# Patient Record
Sex: MALE | Race: WHITE | NOT HISPANIC OR LATINO | Employment: OTHER | URBAN - METROPOLITAN AREA
[De-identification: names, ages, dates, MRNs, and addresses within clinical notes are randomized per-mention and may not be internally consistent; named-entity substitution may affect disease eponyms.]

---

## 2018-10-05 ENCOUNTER — OFFICE VISIT CONVERTED (OUTPATIENT)
Dept: UROLOGY | Facility: CLINIC | Age: 63
End: 2018-10-05
Attending: UROLOGY

## 2019-10-08 ENCOUNTER — HOSPITAL ENCOUNTER (OUTPATIENT)
Dept: OTHER | Facility: HOSPITAL | Age: 64
Discharge: HOME | End: 2019-10-08
Attending: UROLOGY

## 2019-10-08 LAB — PSA SERPL-MCNC: 0.88 NG/ML (ref 0–4)

## 2019-10-09 ENCOUNTER — CONVERSION ENCOUNTER (OUTPATIENT)
Dept: SURGERY | Facility: CLINIC | Age: 64
End: 2019-10-09

## 2019-10-09 ENCOUNTER — OFFICE VISIT CONVERTED (OUTPATIENT)
Dept: UROLOGY | Facility: CLINIC | Age: 64
End: 2019-10-09
Attending: UROLOGY

## 2019-11-05 ENCOUNTER — OFFICE VISIT CONVERTED (OUTPATIENT)
Dept: SURGERY | Facility: CLINIC | Age: 64
End: 2019-11-05
Attending: NURSE PRACTITIONER

## 2020-01-09 ENCOUNTER — OFFICE VISIT CONVERTED (OUTPATIENT)
Dept: SURGERY | Facility: CLINIC | Age: 65
End: 2020-01-09
Attending: SURGERY

## 2020-01-24 ENCOUNTER — HOSPITAL ENCOUNTER (OUTPATIENT)
Dept: PERIOP | Facility: HOSPITAL | Age: 65
Setting detail: HOSPITAL OUTPATIENT SURGERY
Discharge: HOME | End: 2020-01-24
Attending: SURGERY

## 2020-02-03 ENCOUNTER — OFFICE VISIT CONVERTED (OUTPATIENT)
Dept: SURGERY | Facility: CLINIC | Age: 65
End: 2020-02-03
Attending: SURGERY

## 2020-02-03 ENCOUNTER — CONVERSION ENCOUNTER (OUTPATIENT)
Dept: SURGERY | Facility: CLINIC | Age: 65
End: 2020-02-03

## 2020-10-07 ENCOUNTER — HOSPITAL ENCOUNTER (OUTPATIENT)
Dept: FAMILY MEDICINE CLINIC | Facility: CLINIC | Age: 65
Discharge: HOME | End: 2020-10-07
Attending: UROLOGY

## 2020-10-07 LAB — PSA SERPL-MCNC: 0.99 NG/ML (ref 0–4)

## 2020-10-09 ENCOUNTER — TELEPHONE CONVERTED (OUTPATIENT)
Dept: UROLOGY | Facility: CLINIC | Age: 65
End: 2020-10-09
Attending: UROLOGY

## 2021-05-13 NOTE — PROGRESS NOTES
Progress Note      Patient Name: Parveen Silva   Patient ID: [id number]   Sex: Male   YOB: 1955    Primary Care Provider: No PCP No PCP Other   Referring Provider: IGOR DEE    Visit Date: October 9, 2020    Provider: Terry Mueller MD   [facility]: [facility]   Location Address: [address] [place] [address]  [id number]   Location Phone: [phone]          Chief Complaint  · pt here for urologic issues      History Of Present Illness  TELEHEALTH TELEPHONE VISIT  Parveen Silva is a 65 year old /White male who is presenting for evaluation via telehealth telephone visit. Verbal consent obtained before beginning visit.   Provider spent 13 minutes with the patient during the telehealth visit.   The following staff were present during this visit: Renee Dillard   Past Medical History/ Overview of Patient Symptoms     65-year-old  gentleman who follows up today for nephrolithiasis, BPH and prostate cancer screening.    on Flomax  0.4 mg.  voiding ok. slow at times.  A little worse.  maybe. Not bothered. Nocturia X1.    Finasteride  -  caused  joint issues alex in the shoulder.    No gross hematuria     No acute stone episodes since last visit.      Previous     Patient has had one 10 stones, 2 lithotripsies    He was on potassium citrate in the past, not taking this currently    No cardiopulmonary history.  Smokes 1 pack per day.  Patient does not use blood thinner.   2006 right and left inguinal hernia repairs    PVR    10/19   149  10/18   120    PSA    10/20  0.99  10/19  0.88  10/18  0.59  9/17   1.12  9/16   1.00  9/15   1.11  8/11   1.40  9/09   1.0             Past Medical History  Hernia; Kidney stones         Past Surgical History  Cholecstectomy; Colonoscopy; Cyst Removal; Hernia; Kidney Stone Surgery, Unspecified; Other; Splenectomy         Medication List  Flomax 0.4 mg oral capsule; omeprazole 20 mg oral capsule,delayed release(DR/EC);  Prilosec oral         Allergy List  NO KNOWN DRUG ALLERGIES         Family Medical History  Cancer, Unspecified         Social History  Tobacco (Current every day)         Review of Systems  · Constitutional  o Denies  o : chills  · Respiratory  o Denies  o : cough  · Gastrointestinal  o Denies  o : nausea              Assessment  · Benign prostatic hyperplasia with weak urinary stream       Benign prostatic hyperplasia with lower urinary tract symptoms     600.01/N40.1  Poor urinary stream     600.01/R39.12  · Nephrolithiasis     592.0/N20.0  · Prostate cancer screening     V76.44/Z12.5      Plan  · Orders  o Physician Telephone Evaluation, 11-20 minutes (55939) - 600.01/N40.1, 600.01/R39.12, 592.0/N20.0 - 10/09/2020  o PSA Screening, Ultrasensitive, MEDICARE [facility] () - V76.44/Z12.5 - 10/09/2021  · Medications  o Medications have been Reconciled  o Transition of Care or Provider Policy  · Instructions  o Plan Of Care:   o Electronically Identified Patient Education Materials Provided Electronically       BPH    Continue Flomax 0.4 mg QD    cannot tolerate finasteride    not interested in procedures at this time.       Follow-up 1 year with PVR and PSA                     Electronically Signed by: Terry Mueller MD -Author on October 9, 2020 03:55:07 PM

## 2021-05-15 VITALS — WEIGHT: 184 LBS | HEIGHT: 71 IN | RESPIRATION RATE: 16 BRPM | BODY MASS INDEX: 25.76 KG/M2

## 2021-05-15 VITALS — WEIGHT: 177.37 LBS | HEIGHT: 71 IN | RESPIRATION RATE: 18 BRPM | BODY MASS INDEX: 24.83 KG/M2

## 2021-05-15 VITALS — RESPIRATION RATE: 18 BRPM | BODY MASS INDEX: 25.76 KG/M2 | WEIGHT: 184 LBS | HEIGHT: 71 IN

## 2021-05-15 VITALS — WEIGHT: 180 LBS | HEIGHT: 71 IN | HEART RATE: 65 BPM | BODY MASS INDEX: 25.2 KG/M2 | OXYGEN SATURATION: 98 %

## 2021-05-16 VITALS — RESPIRATION RATE: 16 BRPM | WEIGHT: 165 LBS | HEIGHT: 71 IN | BODY MASS INDEX: 23.1 KG/M2

## 2021-10-26 ENCOUNTER — LAB (OUTPATIENT)
Dept: LAB | Facility: HOSPITAL | Age: 66
End: 2021-10-26

## 2021-10-26 ENCOUNTER — TRANSCRIBE ORDERS (OUTPATIENT)
Dept: UROLOGY | Facility: CLINIC | Age: 66
End: 2021-10-26

## 2021-10-26 DIAGNOSIS — Z12.5 SPECIAL SCREENING FOR MALIGNANT NEOPLASM OF PROSTATE: ICD-10-CM

## 2021-10-26 DIAGNOSIS — Z12.5 SPECIAL SCREENING FOR MALIGNANT NEOPLASM OF PROSTATE: Primary | ICD-10-CM

## 2021-10-26 LAB — PSA SERPL-MCNC: 0.89 NG/ML (ref 0–4)

## 2021-10-26 PROCEDURE — 36415 COLL VENOUS BLD VENIPUNCTURE: CPT

## 2021-10-26 PROCEDURE — G0103 PSA SCREENING: HCPCS

## 2021-11-06 PROBLEM — N40.1 BENIGN PROSTATIC HYPERPLASIA WITH URINARY FREQUENCY: Status: ACTIVE | Noted: 2021-11-06

## 2021-11-06 PROBLEM — R35.0 BENIGN PROSTATIC HYPERPLASIA WITH URINARY FREQUENCY: Status: ACTIVE | Noted: 2021-11-06

## 2021-11-06 NOTE — PROGRESS NOTES
Chief Complaint    Urologic complaint    Subjective          Parveen Silva presents to [facility] UROLOGY  History of Present Illness     66-year-old  gentleman who follows up today for nephrolithiasis, BPH and prostate cancer screening.    on Flomax  0.4 mg.  About the same. voiding ok. slow at times.  A little worse.  maybe. Not bothered. Nocturia X1-3    No GH    No acute stone episodes since last visit.    Not worried about ED    No cardiopulmonary history.  Smokes 1 pack per day.  Patient does not use blood thinner.   2006 right and left inguinal hernia repairs    Previous     Finasteride  -  caused  joint issues alex in the shoulder.    Patient has had one 10 stones, 2 lithotripsies    He was on potassium citrate in the past, not taking this currently      PVR    10/21   175  10/19   149  10/18   120    PSA    10/21  0.88  10/20  0.99  10/19  0.88  10/18  0.59  9/17   1.12  9/16   1.00  9/15   1.11  8/11   1.40  9/09   1.0         Results for orders placed or performed in visit on 11/08/21   Bladder Scan   Result Value Ref Range    Urine Volume 175          Past History:  Medical History: has no past medical history on file.   Surgical History: has no past surgical history on file.   Family History: family history is not on file.   Social History:   Allergies: Patient has no allergy information on record.     No current outpatient medications on file.     Physical exam       Alert and orient x3  Well appearing, well developed, in no acute distress   Unlabored respirations    Grossly oriented to person, place and time, judgment is intact, normal mood and affect    Results for orders placed or performed in visit on 10/26/21   PSA Screen    Specimen: Blood   Result Value Ref Range    PSA 0.889 0.000 - 4.000 ng/mL        Objective     Vital Signs:   There were no vitals taken for this visit.             Assessment and Plan    Diagnoses and all orders for this visit:    1. Benign  prostatic hyperplasia with urinary frequency (Primary)        Continue Flomax 0.4 mg QD.  Refilled today      Has been mildly interested in Rezum in the past but not excited about doing this in the office.      We have also discussed risk and benefits of TURP but at this time he is not interested in procedures       Follow-up 1 year with PVR and PSA

## 2021-11-08 ENCOUNTER — OFFICE VISIT (OUTPATIENT)
Dept: UROLOGY | Facility: CLINIC | Age: 66
End: 2021-11-08

## 2021-11-08 VITALS — RESPIRATION RATE: 17 BRPM | WEIGHT: 184 LBS | BODY MASS INDEX: 24.92 KG/M2 | HEIGHT: 72 IN

## 2021-11-08 DIAGNOSIS — Z12.5 ENCOUNTER FOR SCREENING FOR MALIGNANT NEOPLASM OF PROSTATE: ICD-10-CM

## 2021-11-08 DIAGNOSIS — N40.1 BENIGN PROSTATIC HYPERPLASIA WITH URINARY FREQUENCY: Primary | ICD-10-CM

## 2021-11-08 DIAGNOSIS — R35.0 BENIGN PROSTATIC HYPERPLASIA WITH URINARY FREQUENCY: Primary | ICD-10-CM

## 2021-11-08 LAB — URINE VOLUME: 175

## 2021-11-08 PROCEDURE — 51798 US URINE CAPACITY MEASURE: CPT | Performed by: UROLOGY

## 2021-11-08 PROCEDURE — 99213 OFFICE O/P EST LOW 20 MIN: CPT | Performed by: UROLOGY

## 2021-11-08 RX ORDER — TAMSULOSIN HYDROCHLORIDE 0.4 MG/1
1 CAPSULE ORAL DAILY
COMMUNITY
Start: 2021-08-20 | End: 2021-11-08 | Stop reason: SDUPTHER

## 2021-11-08 RX ORDER — TAMSULOSIN HYDROCHLORIDE 0.4 MG/1
1 CAPSULE ORAL DAILY
Qty: 90 CAPSULE | Refills: 4 | Status: SHIPPED | OUTPATIENT
Start: 2021-11-08 | End: 2022-02-06

## 2022-12-19 ENCOUNTER — LAB (OUTPATIENT)
Dept: LAB | Facility: HOSPITAL | Age: 67
End: 2022-12-19

## 2022-12-19 DIAGNOSIS — Z12.5 PROSTATE CANCER SCREENING: Primary | ICD-10-CM

## 2022-12-19 DIAGNOSIS — Z12.5 PROSTATE CANCER SCREENING: ICD-10-CM

## 2022-12-19 LAB — PSA SERPL-MCNC: 1.5 NG/ML (ref 0–4)

## 2022-12-19 PROCEDURE — G0103 PSA SCREENING: HCPCS

## 2022-12-19 PROCEDURE — 36415 COLL VENOUS BLD VENIPUNCTURE: CPT

## 2022-12-28 PROBLEM — N40.1 BENIGN PROSTATIC HYPERPLASIA WITH LOWER URINARY TRACT SYMPTOMS: Status: ACTIVE | Noted: 2022-12-28

## 2022-12-28 NOTE — PROGRESS NOTES
Chief Complaint    Urologic complaint    Subjective          Parveen Silva presents to [facility] UROLOG[facility]  History of Present Illness     67-year-old  gentleman       Nephrolithiasis   BPH    Prostate cancer screening.        on Flomax  0.4 mg.  Doing okay, not worse.  Not bothered  Nocturia X 0 - 2    Decrease caffeine intake.    No GH    No acute stone episodes in the last year.      No cardiopulmonary history.  Smokes 1 pack per day.  Patient does not use blood thinner.      Previous        2006 right and left inguinal hernia repairs    Not worried about ED      Finasteride  -  caused  joint issues alex in the shoulder.    Patient has had one 10 stones, 2 lithotripsies    He was on potassium citrate in the past, not taking this currently      PVR    12/22   160  10/21   175  10/19   149  10/18   120    PSA    12/22      1.5  10/21       0.88  10/20      0.99  10/19      0.88  10/18    0.59  9/17      1.12  9/16      1.00  9/15      1.11  8/11    1.40  9/09     1.0      Results for orders placed or performed in visit on 12/30/22   Bladder Scan   Result Value Ref Range    Urine Volume 160           Results for orders placed or performed in visit on 12/19/22   PSA Screen    Specimen: Blood   Result Value Ref Range    PSA 1.500 0.000 - 4.000 ng/mL         Past History:  Medical History: has no past medical history on file.   Surgical History: has no past surgical history on file.   Family History: family history is not on file.   Social History: reports that he has been smoking cigarettes. He has been smoking an average of 1 pack per day. He has never used smokeless tobacco.  Allergies: Patient has no known allergies.     No current outpatient medications on file.         Results for orders placed or performed in visit on 12/19/22   PSA Screen    Specimen: Blood   Result Value Ref Range    PSA 1.500 0.000 - 4.000 ng/mL        Bladder Scan interpretation 12/30/2022    Estimation of  residual urine via [employer] 3000 Verathon Bladder Scan  MA/nurse performing: Peg Calzada RN  Residual Urine: 160 ml  Indication: Benign prostatic hyperplasia with lower urinary tract symptoms, symptom details unspecified    Prostate cancer screening   Position: Supine  Examination: Incremental scanning of the suprapubic area using 2.0 MHz transducer using copious amounts of acoustic gel.   Findings: An anechoic area was demonstrated which represented the bladder, with measurement of residual urine as noted. I inspected this myself. In that the residual urine was stable or insignificant, refer to plan for treatment and plan necessary at this time.         Objective     Vital Signs:   There were no vitals taken for this visit.             Assessment and Plan    Diagnoses and all orders for this visit:    1. Benign prostatic hyperplasia with lower urinary tract symptoms, symptom details unspecified (Primary)        Continue Flomax 0.4 mg QD.  Refilled today      Patient doing better currently.  Not wanting procedures.  Might be interested in TURP in the future.      Follow-up 1 year  PSA    PVR at f/u

## 2022-12-30 ENCOUNTER — OFFICE VISIT (OUTPATIENT)
Dept: UROLOGY | Facility: CLINIC | Age: 67
End: 2022-12-30

## 2022-12-30 VITALS — WEIGHT: 184 LBS | BODY MASS INDEX: 24.92 KG/M2 | HEIGHT: 72 IN

## 2022-12-30 DIAGNOSIS — Z12.5 PROSTATE CANCER SCREENING: ICD-10-CM

## 2022-12-30 DIAGNOSIS — N40.1 BENIGN PROSTATIC HYPERPLASIA WITH LOWER URINARY TRACT SYMPTOMS, SYMPTOM DETAILS UNSPECIFIED: Primary | ICD-10-CM

## 2022-12-30 LAB — URINE VOLUME: 160

## 2022-12-30 PROCEDURE — 51798 US URINE CAPACITY MEASURE: CPT | Performed by: UROLOGY

## 2022-12-30 PROCEDURE — 99213 OFFICE O/P EST LOW 20 MIN: CPT | Performed by: UROLOGY

## 2022-12-30 RX ORDER — TAMSULOSIN HYDROCHLORIDE 0.4 MG/1
CAPSULE ORAL
COMMUNITY
Start: 2022-10-31 | End: 2022-12-30 | Stop reason: SDUPTHER

## 2022-12-30 RX ORDER — TAMSULOSIN HYDROCHLORIDE 0.4 MG/1
1 CAPSULE ORAL DAILY
Qty: 90 CAPSULE | Refills: 4 | Status: SHIPPED | OUTPATIENT
Start: 2022-12-30

## 2023-12-26 ENCOUNTER — TELEPHONE (OUTPATIENT)
Dept: UROLOGY | Facility: CLINIC | Age: 68
End: 2023-12-26
Payer: MEDICARE

## 2023-12-26 NOTE — TELEPHONE ENCOUNTER
Caller: Parveen Silva    Relationship: Self    Best call back number: [phone]    What orders are you requesting (i.e. lab or imaging): PSA    In what timeframe would the patient need to come in: BEFORE NEXT APPT 2/26/23     Where will you receive your lab/imaging services:  LAB    Additional notes:  WOULD LIKE A CALL, OK TO LEAVE A VM

## 2023-12-27 DIAGNOSIS — Z12.5 PROSTATE CANCER SCREENING: Primary | ICD-10-CM

## 2024-01-25 ENCOUNTER — LAB (OUTPATIENT)
Dept: LAB | Facility: HOSPITAL | Age: 69
End: 2024-01-25
Payer: MEDICARE

## 2024-01-25 DIAGNOSIS — Z12.5 PROSTATE CANCER SCREENING: ICD-10-CM

## 2024-01-25 LAB — PSA SERPL-MCNC: 1.04 NG/ML (ref 0–4)

## 2024-01-25 PROCEDURE — 36415 COLL VENOUS BLD VENIPUNCTURE: CPT

## 2024-01-25 PROCEDURE — G0103 PSA SCREENING: HCPCS

## 2024-01-29 DIAGNOSIS — N40.1 BENIGN PROSTATIC HYPERPLASIA WITH LOWER URINARY TRACT SYMPTOMS, SYMPTOM DETAILS UNSPECIFIED: ICD-10-CM

## 2024-01-29 DIAGNOSIS — Z12.5 PROSTATE CANCER SCREENING: ICD-10-CM

## 2024-01-29 RX ORDER — TAMSULOSIN HYDROCHLORIDE 0.4 MG/1
1 CAPSULE ORAL DAILY
Qty: 90 CAPSULE | Refills: 4 | Status: SHIPPED | OUTPATIENT
Start: 2024-01-29

## 2024-02-24 NOTE — PROGRESS NOTES
Chief Complaint    Urologic complaint    Subjective          Parveen Silva presents to [facility] UROLOGY  History of Present Illness           68-year-old  gentleman       Nephrolithiasis   BPH    Prostate cancer screening.        on Flomax  0.4 mg. No straining, not worse.  Not bothered  Nocturia X 0 - 2      No GH    No acute stone episodes recently      No cardiopulmonary history.  Smokes 1 pack per day.  Patient does not use blood thinner.          Previous        2006 right and left inguinal hernia repairs    Not worried about ED      Finasteride  -  caused  joint issues alex in the shoulder.  H/o 10 stones, 2 lithotripsies    He was on potassium citrate in the past, not taking this currently      PVR    2/24      124  12/22   160  10/21   175  10/19   149  10/18   120        PSA    1/24        1.0   12/22      1.5  10/21       0.88  10/20      0.99  10/19      0.88  10/18    0.59  9/17      1.12  9/16      1.00  9/15      1.11  8/11    1.40  9/09     1.0              Past History:  Medical History: has no past medical history on file.     Bladder Scan interpretation 02/26/2024    Estimation of residual urine via [employer] 3000 Verathon Bladder Scan  MA/nurse performing: ADRYAN Yap  Residual Urine: 124 ml  Indication: Benign prostatic hyperplasia with lower urinary tract symptoms, symptom details unspecified   Position: Supine  Examination: Incremental scanning of the suprapubic area using 2.0 MHz transducer using copious amounts of acoustic gel.   Findings: An anechoic area was demonstrated which represented the bladder, with measurement of residual urine as noted. I inspected this myself. In that the residual urine was stable or insignificant, refer to plan for treatment and plan necessary at this time.          Objective     Vital Signs:   There were no vitals taken for this visit.             Assessment and Plan    Diagnoses and all orders for this visit:    1. Benign prostatic  hyperplasia with lower urinary tract symptoms, symptom details unspecified (Primary)        Continue Flomax 0.4 mg QD.        Not interested in procedures currently.  Might be interested in TURP in the future.      Follow-up 1 year  PSA with NP    PVR at f/u

## 2024-02-26 ENCOUNTER — OFFICE VISIT (OUTPATIENT)
Dept: UROLOGY | Facility: CLINIC | Age: 69
End: 2024-02-26
Payer: MEDICARE

## 2024-02-26 VITALS — WEIGHT: 167 LBS | HEIGHT: 72 IN | RESPIRATION RATE: 16 BRPM | BODY MASS INDEX: 22.62 KG/M2

## 2024-02-26 DIAGNOSIS — N40.1 BENIGN PROSTATIC HYPERPLASIA WITH LOWER URINARY TRACT SYMPTOMS, SYMPTOM DETAILS UNSPECIFIED: Primary | ICD-10-CM

## 2024-02-26 LAB
BILIRUB BLD-MCNC: NEGATIVE MG/DL
CLARITY, POC: CLEAR
COLOR UR: YELLOW
EXPIRATION DATE: NORMAL
GLUCOSE UR STRIP-MCNC: NEGATIVE MG/DL
KETONES UR QL: NEGATIVE
LEUKOCYTE EST, POC: NEGATIVE
Lab: NORMAL
NITRITE UR-MCNC: NEGATIVE MG/ML
PH UR: 6 [PH] (ref 5–8)
PROT UR STRIP-MCNC: NEGATIVE MG/DL
RBC # UR STRIP: NEGATIVE /UL
SP GR UR: 1.01 (ref 1–1.03)
SPECIMEN VOL 24H UR: 124 L
UROBILINOGEN UR QL: NORMAL

## 2024-02-26 PROCEDURE — 51798 US URINE CAPACITY MEASURE: CPT | Performed by: UROLOGY

## 2024-02-26 PROCEDURE — 99213 OFFICE O/P EST LOW 20 MIN: CPT | Performed by: UROLOGY

## 2024-02-26 PROCEDURE — 1159F MED LIST DOCD IN RCRD: CPT | Performed by: UROLOGY

## 2024-02-26 PROCEDURE — 81003 URINALYSIS AUTO W/O SCOPE: CPT | Performed by: UROLOGY

## 2024-02-26 PROCEDURE — 1160F RVW MEDS BY RX/DR IN RCRD: CPT | Performed by: UROLOGY

## 2025-02-21 ENCOUNTER — LAB (OUTPATIENT)
Dept: LAB | Facility: HOSPITAL | Age: 70
End: 2025-02-21
Payer: MEDICARE

## 2025-02-21 DIAGNOSIS — Z12.5 PROSTATE CANCER SCREENING: ICD-10-CM

## 2025-02-21 DIAGNOSIS — Z12.5 PROSTATE CANCER SCREENING: Primary | ICD-10-CM

## 2025-02-21 LAB — PSA SERPL-MCNC: 3.43 NG/ML (ref 0–4)

## 2025-02-21 PROCEDURE — 36415 COLL VENOUS BLD VENIPUNCTURE: CPT

## 2025-02-21 PROCEDURE — G0103 PSA SCREENING: HCPCS

## 2025-02-27 ENCOUNTER — OFFICE VISIT (OUTPATIENT)
Dept: UROLOGY | Age: 70
End: 2025-02-27
Payer: MEDICARE

## 2025-02-27 VITALS — HEIGHT: 71 IN | WEIGHT: 161 LBS | RESPIRATION RATE: 18 BRPM | BODY MASS INDEX: 22.54 KG/M2

## 2025-02-27 DIAGNOSIS — N40.1 BENIGN PROSTATIC HYPERPLASIA WITH LOWER URINARY TRACT SYMPTOMS, SYMPTOM DETAILS UNSPECIFIED: Primary | ICD-10-CM

## 2025-02-27 DIAGNOSIS — R97.20 RISING PSA LEVEL: ICD-10-CM

## 2025-02-27 DIAGNOSIS — Z12.5 PROSTATE CANCER SCREENING: ICD-10-CM

## 2025-02-27 PROBLEM — N20.0 KIDNEY STONES: Status: ACTIVE | Noted: 2025-02-27

## 2025-02-27 PROBLEM — R35.0 BENIGN PROSTATIC HYPERPLASIA WITH URINARY FREQUENCY: Status: RESOLVED | Noted: 2021-11-06 | Resolved: 2025-02-27

## 2025-02-27 PROBLEM — K46.9 ABDOMINAL HERNIA: Status: ACTIVE | Noted: 2025-02-27

## 2025-02-27 LAB — URINE VOLUME: NORMAL

## 2025-02-27 RX ORDER — TAMSULOSIN HYDROCHLORIDE 0.4 MG/1
1 CAPSULE ORAL DAILY
Qty: 90 CAPSULE | Refills: 4 | Status: SHIPPED | OUTPATIENT
Start: 2025-02-27

## 2025-02-27 NOTE — PROGRESS NOTES
"Chief Complaint: Follow-up (Benign prostatic hyperplasia with lower urinary tract symptoms, symptom details unspecified)    Subjective         History of Present Illness  Parveen Silva is a 69 y.o. male presents to [facility] UROLOG[facility] to be seen for annual f/u BPH.    Patient previously known to dr. Mueller.     He has been on on Flomax  0.4 mg. Several years.     He does not see primary care.    No straining.      Nocturia X 1    Denies hesitancy.      No GH    No acute stone episodes recently    PSA from 2/21/25 3.43 up from 1.040        Previous:       No cardiopulmonary history.      Smokes 1 pack per day.      Patient does not use blood thinner.      2006 right and left inguinal hernia repairs     Not worried about ED        Finasteride  -  caused  joint issues alex in the shoulder.  H/o 10 stones, 2 lithotripsies    He was on potassium citrate in the past, not taking this currently      PVR     2/24      124  12/22   160  10/21   175  10/19   149  10/18   120          PSA     1/24        1.0   12/22      1.5  10/21       0.88  10/20      0.99  10/19      0.88  10/18    0.59  9/17      1.12  9/16      1.00  9/15      1.11  8/11    1.40  9/09     1.0     Objective     History reviewed. No pertinent past medical history.    History reviewed. No pertinent surgical history.      Current Outpatient Medications:     tamsulosin (FLOMAX) 0.4 MG capsule 24 hr capsule, Take 1 capsule by mouth Daily., Disp: 90 capsule, Rfl: 4    No Known Allergies     History reviewed. No pertinent family history.    Social History     Socioeconomic History    Marital status:    Tobacco Use    Smoking status: Every Day     Current packs/day: 1.00     Types: Cigarettes    Smokeless tobacco: Never   Vaping Use    Vaping status: Never Used   Substance and Sexual Activity    Alcohol use: Never    Drug use: Never    Sexual activity: Never       Vital Signs:   Resp 18   Ht 181.6 cm (71.5\")   Wt 73 kg (161 lb)   " BMI 22.14 kg/m²      Physical Exam     Result Review :   The following data was reviewed by: LUIZ Kwong on 02/27/2025:  Results for orders placed or performed in visit on 02/27/25   Bladder Scan    Collection Time: 02/27/25  1:11 PM   Result Value Ref Range    Urine Volume 103ml       PSA          2/21/2025    11:38   PSA   PSA 3.430      Bladder Scan interpretation 02/27/2025    Estimation of residual urine via [employer] 3000 Verathon Bladder Scan  MA/nurse performing: aminata reinoso Rn   Residual Urine: 103 ml  Indication: Benign prostatic hyperplasia with lower urinary tract symptoms, symptom details unspecified    Rising PSA level    Prostate cancer screening   Position: Supine  Examination: Incremental scanning of the suprapubic area using 2.0 MHz transducer using copious amounts of acoustic gel.   Findings: An anechoic area was demonstrated which represented the bladder, with measurement of residual urine as noted. I inspected this myself. In that the residual urine was stable or insignificant, refer to plan for treatment and plan necessary at this time.          Procedures        Assessment and Plan    Diagnoses and all orders for this visit:    1. Benign prostatic hyperplasia with lower urinary tract symptoms, symptom details unspecified (Primary)  -     Bladder Scan  -     tamsulosin (FLOMAX) 0.4 MG capsule 24 hr capsule; Take 1 capsule by mouth Daily.  Dispense: 90 capsule; Refill: 4    2. Rising PSA level  -     PSA DIAGNOSTIC; Future    3. Prostate cancer screening  -     tamsulosin (FLOMAX) 0.4 MG capsule 24 hr capsule; Take 1 capsule by mouth Daily.  Dispense: 90 capsule; Refill: 4      His PSA has gone up quite a bit from the last year.     His LUTS are well controled on tamsulosin we will continue this.    Pvr stable at this time.        I spent 10 minutes caring for Parveen on this date of service. This time includes time spent by me in the following activities:reviewing tests, obtaining and/or  reviewing a separately obtained history, performing a medically appropriate examination and/or evaluation , counseling and educating the patient/family/caregiver, ordering medications, tests, or procedures, and documenting information in the medical record  Follow Up   Return in about 3 months (around 5/27/2025) for f/u bph/ rising psa .  Patient was given instructions and counseling regarding his condition or for health maintenance advice. Please see specific information pulled into the AVS if appropriate.         This document has been electronically signed by LUIZ Kwong  February 27, 2025 13:28 EST

## 2025-05-12 ENCOUNTER — TELEPHONE (OUTPATIENT)
Dept: UROLOGY | Age: 70
End: 2025-05-12
Payer: MEDICARE

## 2025-05-12 NOTE — TELEPHONE ENCOUNTER
I have called and spoke to the patient. He is aware that he needs to get a hold of his PCP to refer him to the GI provider.

## 2025-05-12 NOTE — TELEPHONE ENCOUNTER
Caller: Sandra Jesus     Relationship: WIFE    Best call back number: [phone] (home)      What is the medical concern/diagnosis: COLONOSCOPY    What specialty or service is being requested: GASTRO    What is the provider, practice or medical service name: DR SOHAM FRAZIER    What is the office location: [place]    What is the office phone number: [phone]    Any additional details: PT IS NEEDING REFERRAL TO GASTRO FOR COLONOSCOPY. PT HAS NOT SEEN DR FRAZIER OFFICE BEFORE. PLEASE CALL OFFICE IF NEEDED TO VERIFY. PLEASE INCLUDE OP NOTE FROM LAST COLONOSCOPY  ON 1/24/20 WITH DR BERMUDEZ. PLEASE CALL PT WHEN REFERRAL HAS BEEN SENT.

## 2025-05-23 ENCOUNTER — LAB (OUTPATIENT)
Dept: LAB | Facility: HOSPITAL | Age: 70
End: 2025-05-23
Payer: MEDICARE

## 2025-05-23 DIAGNOSIS — R97.20 RISING PSA LEVEL: ICD-10-CM

## 2025-05-23 LAB — PSA SERPL-MCNC: 1.6 NG/ML (ref 0–4)

## 2025-05-23 PROCEDURE — 84153 ASSAY OF PSA TOTAL: CPT

## 2025-05-23 PROCEDURE — 36415 COLL VENOUS BLD VENIPUNCTURE: CPT

## 2025-05-27 ENCOUNTER — OFFICE VISIT (OUTPATIENT)
Dept: UROLOGY | Age: 70
End: 2025-05-27
Payer: MEDICARE

## 2025-05-27 VITALS — RESPIRATION RATE: 16 BRPM | HEIGHT: 72 IN | BODY MASS INDEX: 21.81 KG/M2 | WEIGHT: 161 LBS

## 2025-05-27 DIAGNOSIS — R97.20 RISING PSA LEVEL: ICD-10-CM

## 2025-05-27 DIAGNOSIS — N40.1 BENIGN PROSTATIC HYPERPLASIA WITH LOWER URINARY TRACT SYMPTOMS, SYMPTOM DETAILS UNSPECIFIED: Primary | ICD-10-CM

## 2025-05-27 LAB
BILIRUB BLD-MCNC: NEGATIVE MG/DL
CLARITY, POC: CLEAR
COLOR UR: YELLOW
EXPIRATION DATE: ABNORMAL
GLUCOSE UR STRIP-MCNC: NEGATIVE MG/DL
KETONES UR QL: NEGATIVE
LEUKOCYTE EST, POC: ABNORMAL
Lab: ABNORMAL
NITRITE UR-MCNC: NEGATIVE MG/ML
PH UR: 7 [PH] (ref 5–8)
PROT UR STRIP-MCNC: NEGATIVE MG/DL
RBC # UR STRIP: NEGATIVE /UL
SP GR UR: 1.01 (ref 1–1.03)
URINE VOLUME: 92
UROBILINOGEN UR QL: ABNORMAL

## 2025-05-27 PROCEDURE — 81003 URINALYSIS AUTO W/O SCOPE: CPT | Performed by: NURSE PRACTITIONER

## 2025-05-27 PROCEDURE — 51798 US URINE CAPACITY MEASURE: CPT | Performed by: NURSE PRACTITIONER

## 2025-05-27 PROCEDURE — 1160F RVW MEDS BY RX/DR IN RCRD: CPT | Performed by: NURSE PRACTITIONER

## 2025-05-27 PROCEDURE — 99213 OFFICE O/P EST LOW 20 MIN: CPT | Performed by: NURSE PRACTITIONER

## 2025-05-27 PROCEDURE — 1159F MED LIST DOCD IN RCRD: CPT | Performed by: NURSE PRACTITIONER

## 2025-05-27 NOTE — PROGRESS NOTES
Chief Complaint: Elevated PSA (3 month F/U), Benign Prostatic Hypertrophy, and Prostate Cancer    Subjective         Benign Prostatic Hypertrophy    Prostate Cancer    Parveen Silva is a 70 y.o. male presents to [facility] UROLOGY to be seen for  f/u BPH.    Patient presents today with a repeat PSA from 5/23/2025 which is now noted to be 1.6.    No significant change in urinary symptoms.      Previous:     Patient previously known to dr. Mueller.     He has been on on Flomax  0.4 mg. Several years.     He does not see primary care.    No straining.      Nocturia X 1    Denies hesitancy.      No GH    No acute stone episodes recently    PSA from 2/21/25 3.43 up from 1.040        Previous:       No cardiopulmonary history.      Smokes 1 pack per day.      Patient does not use blood thinner.      2006 right and left inguinal hernia repairs     Not worried about ED        Finasteride  -  caused  joint issues alex in the shoulder.  H/o 10 stones, 2 lithotripsies    He was on potassium citrate in the past, not taking this currently      PVR     2/24      124  12/22   160  10/21   175  10/19   149  10/18   120          PSA     1/24        1.0   12/22      1.5  10/21       0.88  10/20      0.99  10/19      0.88  10/18    0.59  9/17      1.12  9/16      1.00  9/15      1.11  8/11    1.40  9/09     1.0     Objective     History reviewed. No pertinent past medical history.    History reviewed. No pertinent surgical history.      Current Outpatient Medications:     tamsulosin (FLOMAX) 0.4 MG capsule 24 hr capsule, Take 1 capsule by mouth Daily., Disp: 90 capsule, Rfl: 4    No Known Allergies     History reviewed. No pertinent family history.    Social History     Socioeconomic History    Marital status:    Tobacco Use    Smoking status: Every Day     Current packs/day: 1.00     Types: Cigarettes    Smokeless tobacco: Never   Vaping Use    Vaping status: Never Used   Substance and Sexual Activity     "Alcohol use: Never    Drug use: Never    Sexual activity: Never       Vital Signs:   Resp 16   Ht 181.6 cm (71.5\")   Wt 73 kg (161 lb)   BMI 22.14 kg/m²      Physical Exam     Result Review :   The following data was reviewed by: LUIZ Kwong on 05/27/2025:  Results for orders placed or performed in visit on 05/27/25   Bladder Scan    Collection Time: 05/27/25  1:23 PM   Result Value Ref Range    Urine Volume 92    POC Urinalysis Dipstick, Automated    Collection Time: 05/27/25  1:26 PM    Specimen: Urine   Result Value Ref Range    Color Yellow Yellow, Straw, Dark Yellow, Krystyna    Clarity, UA Clear Clear    Specific Gravity  1.010 (A) 1.005 - 1.030    pH, Urine 7.0 (A) 5.0 - 8.0    Leukocytes Trace (A) Negative    Nitrite, UA Negative Negative    Protein, POC Negative Negative mg/dL    Glucose, UA Negative Negative mg/dL    Ketones, UA Negative Negative    Urobilinogen, UA 0.2 E.U./dL Normal, 0.2 E.U./dL    Bilirubin Negative Negative    Blood, UA Negative Negative    Lot Number [id number]     Expiration Date 32,026       PSA          2/21/2025    11:38 5/23/2025    09:54   PSA   PSA 3.430  1.600      Bladder Scan interpretation 05/27/2025    Estimation of residual urine via BVI 3000 [address] Bladder Scan  MA/nurse performing: jessica larson Ma   Residual Urine: 92 ml  Indication: Benign prostatic hyperplasia with lower urinary tract symptoms, symptom details unspecified    Rising PSA level   Position: Supine  Examination: Incremental scanning of the suprapubic area using 2.0 MHz transducer using copious amounts of acoustic gel.   Findings: An anechoic area was demonstrated which represented the bladder, with measurement of residual urine as noted. I inspected this myself. In that the residual urine was stable or insignificant, refer to plan for treatment and plan necessary at this time.          Procedures        Assessment and Plan    Diagnoses and all orders for this visit:    1. Benign prostatic " hyperplasia with lower urinary tract symptoms, symptom details unspecified (Primary)  -     Bladder Scan  -     POC Urinalysis Dipstick, Automated    2. Rising PSA level  -     Bladder Scan  -     POC Urinalysis Dipstick, Automated  -     PSA DIAGNOSTIC; Future      Psa has trended back down.     Will plan for f/u in 6 months with a repeat PSA       I spent 10 minutes caring for Parveen on this date of service. This time includes time spent by me in the following activities:reviewing tests, obtaining and/or reviewing a separately obtained history, performing a medically appropriate examination and/or evaluation , counseling and educating the patient/family/caregiver, ordering medications, tests, or procedures, and documenting information in the medical record  Follow Up   Return in about 6 months (around 11/27/2025) for f/u elevated psa .  Patient was given instructions and counseling regarding his condition or for health maintenance advice. Please see specific information pulled into the AVS if appropriate.         This document has been electronically signed by LUIZ Kwong  May 27, 2025 13:52 EDT

## 2025-08-27 ENCOUNTER — OFFICE VISIT (OUTPATIENT)
Dept: SURGERY | Facility: CLINIC | Age: 70
End: 2025-08-27
Payer: MEDICARE

## 2025-08-27 ENCOUNTER — PREP FOR SURGERY (OUTPATIENT)
Dept: OTHER | Facility: HOSPITAL | Age: 70
End: 2025-08-27
Payer: MEDICARE

## 2025-08-27 VITALS
WEIGHT: 171.74 LBS | BODY MASS INDEX: 23.26 KG/M2 | OXYGEN SATURATION: 97 % | DIASTOLIC BLOOD PRESSURE: 77 MMHG | HEIGHT: 72 IN | SYSTOLIC BLOOD PRESSURE: 133 MMHG | HEART RATE: 63 BPM

## 2025-08-27 DIAGNOSIS — Z12.11 SCREENING FOR MALIGNANT NEOPLASM OF COLON: Primary | ICD-10-CM

## 2025-08-27 DIAGNOSIS — Z12.11 ENCOUNTER FOR SCREENING FOR MALIGNANT NEOPLASM OF COLON: ICD-10-CM

## 2025-08-27 DIAGNOSIS — Z72.0 TOBACCO USE: Primary | ICD-10-CM

## 2025-08-27 RX ORDER — POLYETHYLENE GLYCOL 3350 17 G/17G
POWDER, FOR SOLUTION ORAL
Qty: 238 PACKET | Refills: 0 | Status: SHIPPED | OUTPATIENT
Start: 2025-08-27

## 2025-08-27 RX ORDER — SODIUM CHLORIDE 0.9 % (FLUSH) 0.9 %
3 SYRINGE (ML) INJECTION EVERY 12 HOURS SCHEDULED
OUTPATIENT
Start: 2025-08-27

## 2025-08-27 RX ORDER — SODIUM CHLORIDE 0.9 % (FLUSH) 0.9 %
10 SYRINGE (ML) INJECTION AS NEEDED
OUTPATIENT
Start: 2025-08-27

## 2025-08-27 RX ORDER — LIDOCAINE 50 MG/G
PATCH TOPICAL
COMMUNITY
Start: 2025-08-08

## 2025-08-27 RX ORDER — SODIUM CHLORIDE 9 MG/ML
40 INJECTION, SOLUTION INTRAVENOUS AS NEEDED
OUTPATIENT
Start: 2025-08-27